# Patient Record
(demographics unavailable — no encounter records)

---

## 2024-11-14 NOTE — ASSESSMENT
[FreeTextEntry1] : Ms. AMES is a 72 year old female with a history of HTN, OW, fibroids s/p DNC, ankle surgery s/p fall, recent angioedema hospitalized at [place] 6/2021, angioedema complicated by respiratory failure and bilateral infiltrates who now comes in for a follow up pulmonary evaluation for SOB, allergies, GERD, angioedema, mild XAVIER, abnormal chest X-ray (bilateral infiltrates from hospital)- improved/stable  The patient's SOB is felt to be multifactorial: -poor mechanics of breathing -out of shape/overweight -Pulmonary  -?mild asthma -Cardiac  Problem 1: ?mild asthma - continue Ventolin 2 inhalations up to QID PRN -continue Symbicort 160 2 BID (NC). Reinforced -Order placed for nebulizer -Add Albuterol neb BID up to 4xday PRN for wheeing - Asthma is believed to be caused by inherited (genetic) and environmental factor, but its exact cause is unknown. asthma may be triggered by allergens, lung infections, or irritants in the air. Asthma triggers are different for each person  Problem 1A: Elevated IgE (224) -Xolair is a recombinant DNA- derived humanized IgG1K monoclonal antibody that selectively binds ot human immunoglobulin E (IgE). Xolair is produced by a Chinese hamster ovary cell suspension culture in nutrient medium containing the antibiotic gentamicin. Gentamicin is not detectable in the final product. Xolair is a sterile, white, preservative free, lyophilized powder contained in a single use vial that is reconstituted with sterile water for suspension. Side effects include: wheezing, tightness of the chest, trouble breathing, hives, skin rash, feeling anxious or light-headed, fainting, warmth or tingling under skin, or swelling of face, lips, or tongue  Problem 2: Allergy/sinus -continue Olopatadine 0.6% 1 sniff BID PRN -OTC Allegra PRN -s/p Blood work to include: (+)asthma panel, (+)food IgE panel, + IgE level 224,- eosinophil level, - vitamin D level -Environmental measures for allergies were encouraged including mattress and pillow cover, air purifier, and environmental controls.  Problem 3: GERD -no meds -Rule of 2s: avoid eating too much, eating too late, eating too spicy, eating two hours before bed. - Things to avoid including overeating, spicy foods, tight clothing, eating within two hours of bed, this list is not all inclusive. - For treatments of reflux, possible options discussed including diet control, H2 blockers, PPIs, as well as coating motility agents discussed as treatment options. Timing of meals and proximity of last meal to sleep were discussed. If symptoms persist, a formal gastrointestinal evaluation is needed.  Problem 4: Angioedema (episode 1/2024) -Carry Epipen, Benadryl on person  Problem 5: MIld XAVIER -Weight reduction and positional therapy advised -complete home sleep study 2022 AHI 5 -Sleep apnea is associated with adverse clinical consequences which can affect most organ systems. Cardiovascular disease risk includes arrhythmias, atrial fibrillation, hypertension, coronary artery disease, and stroke. Metabolic disorders include diabetes type 2, non-alcoholic fatty liver disease. Mood disorder especially depression; and cognitive decline especially in the elderly. Associations with chronic reflux/Davis's esophagus some but not all inclusive. -Reasons include arousal consistent with hypopnea; respiratory events most prominent in REM sleep or supine position; therefore sleep staging and body position are important for accurate diagnosis and estimation of AHI.  Problem 6: abnormal chest xray (bilateral infiltrates- taken from hospital) -CXR taken in office showing clear exam (10/19/2021)  Problem 7:Cardiac -Recommend cardiac follow up evaluation with cardiologist if needed  Problem 8: Weight management  Recommended walking 30 min/day 5 days week minimum -recommended Berberine OTC supplement for visceral fat loss  -Recommended Muniq "OTC" Supplement for controlling blood sugar levels, weight loss, and energy - Weight loss, exercise and diet control were discussed and are highly encouraged. Treatment options were given such as aqua therapy, and contacting a nutritionist. Recommended to use the elliptical, stationary bike, less use of treadmill. Mindful eating was explained to the patient. Obesity is associated with worsening asthma, SOB, and potential for cardiac disease, diabetes, and other underlying medical conditions.  Problem 9: Poor mechanics of breathing - Proper breathing techniques were reviewed with an emphasis on exhalation. Patient instructed to breath in for 1 second and out for four seconds. Patient was encouraged not to talk while walking.  Problem 10: Health Maintenance: COVID-19 infection 1/2021 - s/p COVID-19 vax x2 -Recommended flu vaccine -recommended strep pneumonia vaccines: Prevnar-13 vaccine, follow by Pneumo vaccine 23 one year following -recommended early intervention for URIs -recommended regular osteoporosis evaluations -recommended early dermatological evaluations -recommended after the age of 50 to the age of 70, colonoscopy every 5 years  F/P in 4-6 months with Dr. Millard with PFTs and [facility]IO[facility] pt is encouraged to call or fax the office with any questions or concerns.
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
## 2024-11-14 NOTE — PHYSICAL EXAM
[No Acute Distress] : no acute distress [Normal Appearance] : normal appearance [No Neck Mass] : no neck mass [Normal Rate/Rhythm] : normal rate/rhythm [Normal S1, S2] : normal s1, s2 [No Resp Distress] : no resp distress [Clear to Auscultation Bilaterally] : clear to auscultation bilaterally [No Abnormalities] : no abnormalities [Normal Gait] : normal gait [No Clubbing] : no clubbing [No Cyanosis] : no cyanosis [No Edema] : no edema [FROM] : FROM [Normal Color/ Pigmentation] : normal color/ pigmentation [No Focal Deficits] : no focal deficits [Oriented x3] : oriented x3 [Normal Affect] : normal affect

## 2024-11-14 NOTE — REASON FOR VISIT
[Follow-Up] : a follow-up visit [TextBox_44] : SOB, allergies, GERD, angioedema, mild XAVIER,abnormal chest xray (bilateral infiltrates from hospital)